# Patient Record
(demographics unavailable — no encounter records)

---

## 2024-10-15 NOTE — HISTORY OF PRESENT ILLNESS
[FreeTextEntry1] : Status post Niraj's reversal of ileostomy. Patient progressing well. Hospital course complicated by tachycardia. CT scan images showed no signs of infection or pulmonary embolism. Patient progressing well tolerating diet no fevers or chills otherwise without complaint

## 2024-10-15 NOTE — ASSESSMENT
[FreeTextEntry1] : Status post  colostomy reversal -Patient progressing well -Diet as tolerated -followup in one week for remaining staple removal and rubber catheter removal

## 2024-10-24 NOTE — PHYSICAL EXAM
[FreeTextEntry1] : Abdomen soft incision intact remaining staples removed stoma functioning [place] catheter removed

## 2024-10-24 NOTE — ASSESSMENT
[FreeTextEntry1] : Status post colostomy reversal -Patient progressing well -Diet as tolerated - followup in 5 weeks for sigmoidoscopy

## 2024-10-24 NOTE — HISTORY OF PRESENT ILLNESS
[FreeTextEntry1] : Status post Niraj's reversal of ileostomy. Patient progressing well. Tolerating diet. Stoma with decreased output over the weekend but has since resumed no fevers or chills

## 2024-11-08 NOTE — HISTORY OF PRESENT ILLNESS
[FreeTextEntry1] : 65 year old woman with a history of mild elevation of LDL and palpitations with strong FH of CAD (dad with 4vcabg and mom with ?SCD at 60) Last seen 2022- had CCTA that showed mild LAD disease     #Elevated LDL with LAD calcification on chest CT- CCTA with mild LAD disease Will start statin - Lipitor 20 mg daily  #Patient medically optimized for GI surgery- please ensure adequate hydration post operatively to avoid sinus tachycardia. No further cardiac workup needed.EKG normal

## 2024-11-08 NOTE — DISCUSSION/SUMMARY
[EKG obtained to assist in diagnosis and management of assessed problem(s)] : EKG obtained to assist in diagnosis and management of assessed problem(s) [FreeTextEntry1] : 65 year old woman with a history of mild elevation of LDL and palpitations with strong FH of CAD (dad with 4vcabg and mom with ?SCD at 60) Last seen 2022- had CCTA that showed mild LAD disease   #Elevated LDL with LAD calcification on chest CT- CCTA with mild LAD disease Will start statin - Lipitor 20 mg daily #Patient medically optimized for GI surgery- please ensure adequate hydration post operatively to avoid sinus tachycardia. No further cardiac workup needed.EKG normal

## 2024-12-03 NOTE — HISTORY OF PRESENT ILLNESS
[FreeTextEntry1] : Status post colostomy reversal with ileostomy. Patient progressed well. Tolerating diet no fevers or chills stoma functioning

## 2024-12-03 NOTE — ASSESSMENT
[FreeTextEntry1] : Diverticulitis -Colorectal anastomosis appears widely patent -We'll proceed with Hypaque enema -AAbnormal study, we'll initiate scheduling of ileostomy Reversal

## 2024-12-03 NOTE — PHYSICAL EXAM
[FreeTextEntry1] : Abdomen soft incision intact stoma functioning External anal exam no masses or lesions Digital rectal exam normal tone Flexible sigmoidoscopy-procedure performed in standard fashion under direct vision with a standard colonoscope. The scope was introduced in the anus and advanced to the colorectal anastomosis -Anastomosis noted to be widely patent and easily traversed